# Patient Record
Sex: FEMALE | Race: WHITE
[De-identification: names, ages, dates, MRNs, and addresses within clinical notes are randomized per-mention and may not be internally consistent; named-entity substitution may affect disease eponyms.]

---
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
## 2019-01-09 ENCOUNTER — HOSPITAL ENCOUNTER (OUTPATIENT)
Dept: HOSPITAL 91 - SDS | Age: 19
Discharge: HOME | End: 2019-01-09
Payer: COMMERCIAL

## 2019-01-09 ENCOUNTER — HOSPITAL ENCOUNTER (OUTPATIENT)
Dept: HOSPITAL 10 - SDS | Age: 19
Discharge: HOME | End: 2019-01-09
Attending: INTERNAL MEDICINE
Payer: COMMERCIAL

## 2019-01-09 VITALS — SYSTOLIC BLOOD PRESSURE: 130 MMHG | DIASTOLIC BLOOD PRESSURE: 78 MMHG

## 2019-01-09 VITALS — DIASTOLIC BLOOD PRESSURE: 57 MMHG | SYSTOLIC BLOOD PRESSURE: 107 MMHG | HEART RATE: 96 BPM | RESPIRATION RATE: 18 BRPM

## 2019-01-09 VITALS — SYSTOLIC BLOOD PRESSURE: 135 MMHG | DIASTOLIC BLOOD PRESSURE: 82 MMHG

## 2019-01-09 VITALS — DIASTOLIC BLOOD PRESSURE: 76 MMHG | SYSTOLIC BLOOD PRESSURE: 142 MMHG

## 2019-01-09 VITALS — DIASTOLIC BLOOD PRESSURE: 68 MMHG | SYSTOLIC BLOOD PRESSURE: 133 MMHG

## 2019-01-09 VITALS — SYSTOLIC BLOOD PRESSURE: 137 MMHG | DIASTOLIC BLOOD PRESSURE: 90 MMHG

## 2019-01-09 VITALS — SYSTOLIC BLOOD PRESSURE: 134 MMHG | DIASTOLIC BLOOD PRESSURE: 78 MMHG

## 2019-01-09 VITALS — SYSTOLIC BLOOD PRESSURE: 120 MMHG | DIASTOLIC BLOOD PRESSURE: 72 MMHG

## 2019-01-09 VITALS
BODY MASS INDEX: 19.35 KG/M2 | HEIGHT: 62 IN | WEIGHT: 105.16 LBS | HEIGHT: 62 IN | WEIGHT: 105.16 LBS | BODY MASS INDEX: 19.35 KG/M2

## 2019-01-09 VITALS — SYSTOLIC BLOOD PRESSURE: 129 MMHG | DIASTOLIC BLOOD PRESSURE: 79 MMHG

## 2019-01-09 VITALS — SYSTOLIC BLOOD PRESSURE: 133 MMHG | DIASTOLIC BLOOD PRESSURE: 79 MMHG

## 2019-01-09 VITALS — DIASTOLIC BLOOD PRESSURE: 81 MMHG | SYSTOLIC BLOOD PRESSURE: 140 MMHG

## 2019-01-09 VITALS — SYSTOLIC BLOOD PRESSURE: 126 MMHG | DIASTOLIC BLOOD PRESSURE: 71 MMHG

## 2019-01-09 DIAGNOSIS — K80.50: Primary | ICD-10-CM

## 2019-01-09 PROCEDURE — 43264 ERCP REMOVE DUCT CALCULI: CPT

## 2019-01-09 PROCEDURE — 71045 X-RAY EXAM CHEST 1 VIEW: CPT

## 2019-01-09 PROCEDURE — 43275 ERCP REMOVE FORGN BODY DUCT: CPT

## 2019-01-09 PROCEDURE — 84703 CHORIONIC GONADOTROPIN ASSAY: CPT

## 2019-01-09 PROCEDURE — 74330 X-RAY BILE/PANC ENDOSCOPY: CPT

## 2019-01-09 RX ADMIN — ONDANSETRON HYDROCHLORIDE 1 MG: 2 INJECTION, SOLUTION INTRAMUSCULAR; INTRAVENOUS at 18:48

## 2019-01-09 NOTE — PREAC
Date/Time of Note


Date/Time of Note


DATE: 1/9/19 


TIME: 16:57





Anesthesia Eval and Record


Evaluation


Time Pre-Procedure Interview


DATE: 1/9/19 


TIME: 16:57


Age


18


Sex


female


NPO:  8 hrs


Preoperative diagnosis


biliary duct stent


Planned procedure


ercp





Past Medical History


Past Medical History:  None





Surgery & Anesthesia Issues


No known issue





Meds


Anticoagulation:  No


Beta Blocker within 24 hr:  No


Reason Beta Blocker not given:  Pt. not on B-Blocker


Discontinued Reported Medications


Hydrocodone/Acetaminophen (Norco 5-325 Tablet) 1 Each Tablet, 1 EACH PO, TAB


   8/29/18


Discontinued Scripts


Metoclopramide* (Reglan*) 10 Mg Tablet, 10 MG PO Q6 PRN for NAUSEA AND/OR 


VOMITING, #20 TAB


   Prov:NICOLAS AG NP         9/14/18


Ranitidine Hcl* (Zantac*) 150 Mg Tablet, 150 MG PO BID PRN for EPIGASTRIC PAIN, 


#30 TAB


   Prov:NICOLAS AG NP         9/14/18


Magaldrate/Simethicone* (Mylanta*) 355 Ml Susp, 30 ML PO QID PRN for 


GASTROINTESTINAL UPSET, #1 BOTTLE


   Prov:NICOLAS AG NP         9/14/18


Ondansetron (Ondansetron Odt) 4 Mg Tab.rapdis, 4 MG PO Q6H PRN for NAUSEA AND/OR


VOMITING, #10 TAB


   Prov:GELACIO RICO         8/29/18


Hydrocodone/Acetaminophen (Norco  Tablet) 1 Each Tablet, 1 TAB PO Q6H PRN 


for PAIN, #20 TAB


   Prov:GELACIO RICO         8/29/18


Meds reviewed:  Yes





Allergies


Coded Allergies:  


     No Known Allergy (Unverified , 1/9/19)


Allergies Reviewed:  Yes





Labs/Studies


Labs Reviewed:  Reviewed by anesthesiologist


Pregnancy test:  Negative





Pre-procedure Exam


Last vitals





Vital Signs


  Date      Temp  Pulse  Resp  B/P (MAP)   Pulse Ox  O2          O2 Flow    FiO2


Time                                                 Delivery    Rate


    1/9/19  96.7     96    18      107/57        98  Room Air


     14:12                           (74)





Airway:  Adequate mouth opening, Adequate thyromental dist


Mallampati:  Mallampati I


Teeth:  Normal


Lung:  Normal


Heart:  Normal





ASA Physical Status


ASA physical status:  1


Emergency:  None





Pre-operative Attestations


Prior to commencing anesthesia and surgery, the patient was re-evaluated, there 


was verification of:


*The patient's identity


*The results of appropriate recent lab work and preoperative vital signs


*The above evaluation not changing prior to induction


*Anesthetic plan, risk benefits, alternative and complications discussed with 


patient/family; questions answered; patient/family understands, accepts and wi


shes to proceed.











ULI DANGELO DO              Jan 9, 2019 16:58

## 2019-01-09 NOTE — OPPN
Date/Time of Note


Date/Time of Note


DATE: 1/9/19 


TIME: 17:42





Proc Note GI


Procedure Date


1/9/19


Indication:  diagnostic, treatment


Pre-procedure Diagnosis


History of choledocholithiasis post biliary stent placement


Post-procedure Diagnosis


Impression:


Biliary stent removal.


Choledocholithiasis.


Post stone removal.





Plan:


Observation.


Follow-up as an outpatient.


Procedure Performed:  ERCP (With stone and stent removal)


Surgeon


ELLIOT DASILVA MD


See signature line


Assistant


none


Anesthesia Type:  general


Anesthesiologist:  ULI DANGELO DO


Tourniquet Time


none


EBL


   none


Transfusion required


   none


Biopsy 1:  None


Grafts/Implants


none


Tubes/Drains


none


Complication(s)


none


Disposition:  PACU, home


Procedure Description





After informed consent, with the patient/relatives understanding the procedure, 


its indications, potential risks and complications, including but not limited 


to: allergic reaction, bleeding, perforation or infection, and after all pertin


ent questions were answered to the patients satisfaction, the patient/relatives


signed witnessed informed consent. 


Following this, premedication was administered slowly IV push under careful 


cardiovascular and respiratory monitoring with pulse oximetry, automatic blood 


pressure, and EKG monitor. Once the sedative effect was achieved the patient was


place in the prone position in the radiology special procedures suite; the side 


viewing panendoscope was introduced and advanced under visual control. 


Careful examination of the upper gastrointestinal tract, both on insertion as 


well as withdrawal of the instrument disclosed the following findings:


Esophagus: The mucosa of the entire appears within normal limits. There is no 


evidence of esophagitis, varices, neoplasm or stricture. No Hiatal Hernia 


identified. 


Stomach: Upon entrance to the stomach air was insufflated, the gastric walls 


distended normally, the mucosa of the fundus, body and antrum of the stomach was


carefully examined both head-on and on retroflexion, and shows no abnormalities.


There is no evidence of gastritis, ulcers, or neoplasm. 


Pylorus: The pylorus appears patent and within normal limits, with no evidence 


of gastric outlet obstruction. 


Duodenum: The duodenal mucosa was carefully examined in the duodenal bulb as 


well as the second portion of the duodenum and appears unremarkable with no 


evidence of duodenitis, ulcer or neoplasm. 


Ampulla of vater: The ampulla of Vater was identified and carefully examined a 


biliary stent is noted exiting the ampulla.  The stent was secured with a 


polypectomy snare and retrieved without difficulty.  


Cannulation: At this point cannulation was accomplished with the following 


fluoroscopic findings:


Pancreatogram: Avoided purposely


Cholangiogram: The biliary tree significantly dilated with a maximum diameter of


15 mm.  Multiple filling defects are present.  A balloon catheter was introduced


measuring 12-15 mm and the biliary tree was swept in multiple locations removing


at least 7 large proximally 1 cm stones.  Balloon cholangiogram was then 


obtained which shows no residual stones and rapid emptying.


The instrument was then withdrawn the patient tolerated the procedure well and 


was transfer out of the endoscopy suite awake, and in good condition to continue


to recover under observation.





Copies To:


CC:   ELLIOT DASILVA MD ;











ELLIOT DASILVA MD                 Jan 9, 2019 17:45

## 2019-01-09 NOTE — PAC
Date/Time of Note


Date/Time of Note


DATE: 1/9/19 


TIME: 17:54





Post-Anesthesia Notes


Post-Anesthesia Note


Last documented vital signs





Vital Signs


  Date      Temp  Pulse  Resp  B/P (MAP)  Pulse Ox  O2          O2 Flow     FiO2


Time                                                Delivery    Rate


    1/9/19    98    100    18     110/60        98  Room Air


      1758





Activity:  WNL


Respiratory function:  WNL


Cardiovascular function:  WNL


Mental status:  Baseline


Pain reasonably controlled:  Yes


Hydration appropriate:  Yes


Nausea/Vomiting absent:  Yes











ULI DANGELO DO              Jan 9, 2019 17:55

## 2019-01-12 ENCOUNTER — HOSPITAL ENCOUNTER (EMERGENCY)
Dept: HOSPITAL 10 - FTE | Age: 19
Discharge: LEFT BEFORE DISCHARGE | End: 2019-01-12
Payer: SELF-PAY

## 2019-01-12 ENCOUNTER — HOSPITAL ENCOUNTER (EMERGENCY)
Dept: HOSPITAL 91 - FTE | Age: 19
Discharge: HOME | End: 2019-01-12
Payer: COMMERCIAL

## 2019-01-12 ENCOUNTER — HOSPITAL ENCOUNTER (EMERGENCY)
Dept: HOSPITAL 91 - FTE | Age: 19
Discharge: LEFT BEFORE DISCHARGE | End: 2019-01-12
Payer: SELF-PAY

## 2019-01-12 ENCOUNTER — HOSPITAL ENCOUNTER (EMERGENCY)
Dept: HOSPITAL 10 - FTE | Age: 19
Discharge: HOME | End: 2019-01-12
Payer: COMMERCIAL

## 2019-01-12 VITALS — HEIGHT: 55 IN | BODY MASS INDEX: 24.49 KG/M2 | WEIGHT: 105.82 LBS

## 2019-01-12 VITALS — SYSTOLIC BLOOD PRESSURE: 120 MMHG | DIASTOLIC BLOOD PRESSURE: 80 MMHG | HEART RATE: 81 BPM | RESPIRATION RATE: 18 BRPM

## 2019-01-12 VITALS — HEIGHT: 55 IN | WEIGHT: 103.84 LBS | BODY MASS INDEX: 24.03 KG/M2

## 2019-01-12 VITALS — RESPIRATION RATE: 18 BRPM | SYSTOLIC BLOOD PRESSURE: 127 MMHG | HEART RATE: 79 BPM | DIASTOLIC BLOOD PRESSURE: 82 MMHG

## 2019-01-12 DIAGNOSIS — J02.9: Primary | ICD-10-CM

## 2019-01-12 DIAGNOSIS — Z53.21: Primary | ICD-10-CM

## 2019-01-12 PROCEDURE — 99283 EMERGENCY DEPT VISIT LOW MDM: CPT

## 2019-01-12 NOTE — ERD
ER Documentation


Chief Complaint


Chief Complaint





HAD ERCP LAST THURSDAY TODAY C/O SORE THROAT





HPI


18-year-old female presents here to emergency department for complaints of 


throat discomfort after ERCP procedure 4 days ago, complains of pain throbbing 


pain, 6/10 scale, as was upon falling.  Noted some yellowish discoloration in 


the throat.  Patient denies any stridor or shortness of breath.  Patient did not


take any medications to help with symptoms





ROS


All systems reviewed and are negative except as per history of present illness.





Medications


Home Meds


Active Scripts


Ibuprofen* (Motrin*) 400 Mg Tab, 400 MG PO Q6H PRN for PAIN AND OR ELEVATED 


TEMP, #30 TAB


   Prov:NICOLAS AG NP         1/12/19


Amoxicillin* (Amoxicillin*) 500 Mg Cap, 500 MG PO TID for 10 Days, CAP


   Prov:NICOLAS AG NP         1/12/19


Discontinued Reported Medications


Hydrocodone/Acetaminophen (Norco 5-325 Tablet) 1 Each Tablet, 1 EACH PO, TAB


   8/29/18


Discontinued Scripts


Metoclopramide* (Reglan*) 10 Mg Tablet, 10 MG PO Q6 PRN for NAUSEA AND/OR 


VOMITING, #20 TAB


   Prov:NICOLAS AG NP         9/14/18


Ranitidine Hcl* (Zantac*) 150 Mg Tablet, 150 MG PO BID PRN for EPIGASTRIC PAIN, 


#30 TAB


   Prov:NICOLAS AG NP         9/14/18


Magaldrate/Simethicone* (Mylanta*) 355 Ml Susp, 30 ML PO QID PRN for 


GASTROINTESTINAL UPSET, #1 BOTTLE


   Prov:NICOLAS AG NP         9/14/18


Ondansetron (Ondansetron Odt) 4 Mg Tab.rapdis, 4 MG PO Q6H PRN for NAUSEA AND/OR


VOMITING, #10 TAB


   Prov:GELACIO RICO         8/29/18


Hydrocodone/Acetaminophen (Norco  Tablet) 1 Each Tablet, 1 TAB PO Q6H PRN 


for PAIN, #20 TAB


   Prov:GELACIO RICO         8/29/18





Allergies


Allergies:  


Coded Allergies:  


     No Known Allergy (Unverified , 1/9/19)





PMhx/Soc


Medical and Surgical Hx:  pt denies Medical Hx, pt denies Surgical Hx


History of Surgery:  Yes (HAILE, ERCP)


Anesthesia Reaction:  No


Hx Neurological Disorder:  No


Hx Respiratory Disorders:  No


Hx Cardiac Disorders:  No


Hx Psychiatric Problems:  No


Hx Miscellaneous Medical Probl:  No


Hx Alcohol Use:  No


Hx Substance Use:  No


Hx Tobacco Use:  No


Smoking Status:  Never smoker





FmHx


Family History:  No diabetes, No coronary disease, No other





Physical Exam


Vitals





Vital Signs


  Date      Temp  Pulse  Resp  B/P (MAP)   Pulse Ox  O2          O2 Flow    FiO2


Time                                                 Delivery    Rate


   1/12/19  98.1     67    18      123/67        99


     17:53                           (85)





Physical Exam


GENERAL:  The patient is well developed and appropriate for usual state of 


health, in no apparent distress.


HEENT: Atraumatic. Ears: Normal tympanic membrane, no erythema or bulging. No 


ear canal swelling. No ear discharge. Nose: normal nasal turbinates, no erythema


or swelling. Normal nasal discharge. Throat: oropharynx clear with exudates 


noted. No lymphadenopathy.


CHEST:  Clear to auscultation bilaterally. There are no rales, wheezes or 


rhonchi. 


HEART:  Regular rate and rhythm. No murmurs, clicks, rubs or gallops. No S3 or 


S4.


ABDOMEN:  Soft, nontender and nondistended. Good bowel sounds. No rebound or 


guarding. No gross peritonitis. No gross organomegaly or masses. No Baird sign 


or McBurney point tenderness.


BACK:  No midline or flank tenderness.


EXTREMITIES:  Equal pulses bilaterally. There is no peripheral clubbing, 


cyanosis or edema. No focal swelling or erythema. Full range of motion. Grossly 


neurovascularly intact.


NEURO:  Alert and oriented. Cranial nerves 2-12 intact. Motor strength in all 4 


extremities with 5/5 strength.  Sensation grossly intact. Normal speech and 


gait. 


SKIN:  There is no apparent rash or petechia. The skin is warm and dry.


HEMATOLOGIC AND LYMPHATIC:  There is no evidence of excessive bruising or 


lymphedema. No gross cervical, axillary, or inguinal lymphadenopathy.





Procedures/MDM


Medical decision making: Patient symptoms is likely consistent with acute 


bacterial pharyngitis, most likely strep throat. Low suspicion for peritonsillar


abscess, mononucleosis, no symptoms of epiglottitis, laryngitis. No oral airway 


obstruction noted. No symptoms of sepsis at this time. Patient appears well and 


is hemodynamically stable. Patient was given for amoxicillin, ibuprofen,is adv


ised to follow-up with primary care doctor in 2-3 days for reevaluation of 


symptoms. Patient is advised to do salt water gargles. Patient is advised to 


return to emergency department for worsening symptoms.





Disposition: Home. Stable.





Disclaimer: Inadvertent spelling and grammatical errors are likely due to 


EHR/dictation software use and do not reflect on the overall quality of patient 


care. Also, please note that the electronic time recorded on this note does not 


necessarily reflect the actual time of the patient encounter.





Departure


Diagnosis:  


   Primary Impression:  


   Acute bacterial pharyngitis


Condition:  Stable


Patient Instructions:  Pharyngitis, Strep (Presumed)











NICOLAS AG NP         Jan 12, 2019 20:32

## 2019-01-22 NOTE — HPN
Date/Time of Note


Date/Time of Note


DATE: 1/22/19 


TIME: 08:57





Interval H&P Admission Note


Pt. seen H&P reviewed:  No system changes











ELLIOT DASILVA MD                Jan 22, 2019 08:57